# Patient Record
Sex: MALE | Race: WHITE | URBAN - METROPOLITAN AREA
[De-identification: names, ages, dates, MRNs, and addresses within clinical notes are randomized per-mention and may not be internally consistent; named-entity substitution may affect disease eponyms.]

---

## 2017-01-12 ENCOUNTER — OFFICE VISIT (OUTPATIENT)
Dept: SURGERY | Age: 82
End: 2017-01-12

## 2017-01-12 VITALS
HEIGHT: 71 IN | HEART RATE: 94 BPM | DIASTOLIC BLOOD PRESSURE: 83 MMHG | BODY MASS INDEX: 26.88 KG/M2 | SYSTOLIC BLOOD PRESSURE: 138 MMHG | WEIGHT: 192 LBS

## 2017-01-12 DIAGNOSIS — D17.22 LIPOMA OF LEFT UPPER EXTREMITY: ICD-10-CM

## 2017-01-12 DIAGNOSIS — D18.00 CAVERNOUS ANGIOMA: Primary | ICD-10-CM

## 2017-01-12 PROCEDURE — 99024 POSTOP FOLLOW-UP VISIT: CPT | Performed by: SURGERY

## 2017-01-24 ENCOUNTER — PROCEDURE VISIT (OUTPATIENT)
Dept: SURGERY | Age: 82
End: 2017-01-24

## 2017-01-24 VITALS
HEART RATE: 79 BPM | BODY MASS INDEX: 26.88 KG/M2 | HEIGHT: 71 IN | DIASTOLIC BLOOD PRESSURE: 78 MMHG | SYSTOLIC BLOOD PRESSURE: 136 MMHG | WEIGHT: 192 LBS

## 2017-01-24 DIAGNOSIS — D17.22 LIPOMA OF LEFT UPPER EXTREMITY: Primary | ICD-10-CM

## 2017-01-24 PROCEDURE — 99024 POSTOP FOLLOW-UP VISIT: CPT | Performed by: SURGERY

## 2024-07-18 ENCOUNTER — OFFICE VISIT (OUTPATIENT)
Dept: ENT CLINIC | Age: 89
End: 2024-07-18
Payer: MEDICARE

## 2024-07-18 VITALS
WEIGHT: 186 LBS | BODY MASS INDEX: 27.55 KG/M2 | HEART RATE: 73 BPM | DIASTOLIC BLOOD PRESSURE: 80 MMHG | HEIGHT: 69 IN | OXYGEN SATURATION: 96 % | SYSTOLIC BLOOD PRESSURE: 146 MMHG

## 2024-07-18 DIAGNOSIS — R09.89 CHRONIC THROAT CLEARING: ICD-10-CM

## 2024-07-18 DIAGNOSIS — R09.89 PHLEGM IN THROAT: ICD-10-CM

## 2024-07-18 DIAGNOSIS — K21.9 LARYNGOPHARYNGEAL REFLUX (LPR): ICD-10-CM

## 2024-07-18 DIAGNOSIS — R13.10 DYSPHAGIA, UNSPECIFIED TYPE: Primary | ICD-10-CM

## 2024-07-18 PROCEDURE — G8427 DOCREV CUR MEDS BY ELIG CLIN: HCPCS | Performed by: STUDENT IN AN ORGANIZED HEALTH CARE EDUCATION/TRAINING PROGRAM

## 2024-07-18 PROCEDURE — 1123F ACP DISCUSS/DSCN MKR DOCD: CPT | Performed by: STUDENT IN AN ORGANIZED HEALTH CARE EDUCATION/TRAINING PROGRAM

## 2024-07-18 PROCEDURE — 1036F TOBACCO NON-USER: CPT | Performed by: STUDENT IN AN ORGANIZED HEALTH CARE EDUCATION/TRAINING PROGRAM

## 2024-07-18 PROCEDURE — 99204 OFFICE O/P NEW MOD 45 MIN: CPT | Performed by: STUDENT IN AN ORGANIZED HEALTH CARE EDUCATION/TRAINING PROGRAM

## 2024-07-18 PROCEDURE — G8419 CALC BMI OUT NRM PARAM NOF/U: HCPCS | Performed by: STUDENT IN AN ORGANIZED HEALTH CARE EDUCATION/TRAINING PROGRAM

## 2024-07-18 PROCEDURE — 31575 DIAGNOSTIC LARYNGOSCOPY: CPT | Performed by: STUDENT IN AN ORGANIZED HEALTH CARE EDUCATION/TRAINING PROGRAM

## 2024-07-18 RX ORDER — PANTOPRAZOLE SODIUM 40 MG/1
40 TABLET, DELAYED RELEASE ORAL 2 TIMES DAILY
Qty: 180 TABLET | Refills: 0 | Status: SHIPPED | OUTPATIENT
Start: 2024-07-18 | End: 2024-10-16

## 2024-07-18 RX ORDER — GUAIFENESIN 600 MG/1
600 TABLET, EXTENDED RELEASE ORAL 2 TIMES DAILY
Qty: 30 TABLET | Refills: 0 | Status: SHIPPED | OUTPATIENT
Start: 2024-07-18 | End: 2024-08-02

## 2024-07-18 NOTE — PROGRESS NOTES
[facility]  DIVISION OF OTOLARYNGOLOGY- HEAD & NECK SURGERY  CONSULT      Samuel Chowdhury (:  4/3/1932) is a 89 y.o. male, here for evaluation of the following chief complaint(s):  Hoarse (Clearing throat )      ASSESSMENT/PLAN:  1. Dysphagia, unspecified type  2. Chronic throat clearing  3. Phlegm in throat  4. Laryngopharyngeal reflux (LPR)      This is a very pleasant 89 y.o. male here today for evaluation of the the above-noted complaints.      I will prescribe the patient guaifenesin 600 mg to use for the next several weeks to see if this improves some of his secretions.    I will begin him on Protonix 40 mg twice daily.    We discussed that phlegm in the back of the throat can be difficult to treat.  We will treat the most common causes first.  Consideration of referral to speech and language pathology for reflux management and possible chronic throat clearing.  We discussed prognosis is guarded especially given the chronicity of his symptoms..    Medical Decision Making:  The following items were considered in medical decision making:  Independent review of images  Review / order clinical lab tests  Review / order radiology tests  Decision to obtain old records  Review and summation of old records as accessed through [employer] if applicable    SUBJECTIVE/OBJECTIVE:  HPI    Samuel Chowdhury is here today for evaluation of issues related to his throat.  The patient states that he has been having chronic throat clearing for over a decade.  It is gradually getting worse.  Sometimes his voice will become muffled and he will have voice changes and trouble swallowing.  No aspiration, no recent pneumonia, no unintentional weight loss, fevers, chills, night sweats or neck masses.  No hemoptysis.  He thinks he has tried reflux medication in the past.  No recent changes to reflux medication.  Has not been seen by GI recently.  No new medications.  Phlegm is clear to white.  No sinus infections.    He has been

## 2024-11-13 ENCOUNTER — OFFICE VISIT (OUTPATIENT)
Dept: ENT CLINIC | Age: 89
End: 2024-11-13

## 2024-11-13 VITALS
WEIGHT: 190 LBS | DIASTOLIC BLOOD PRESSURE: 72 MMHG | OXYGEN SATURATION: 96 % | SYSTOLIC BLOOD PRESSURE: 128 MMHG | BODY MASS INDEX: 28.14 KG/M2 | HEIGHT: 69 IN | HEART RATE: 80 BPM

## 2024-11-13 DIAGNOSIS — R09.89 PHLEGM IN THROAT: ICD-10-CM

## 2024-11-13 DIAGNOSIS — R09.89 CHRONIC THROAT CLEARING: ICD-10-CM

## 2024-11-13 DIAGNOSIS — R13.10 DYSPHAGIA, UNSPECIFIED TYPE: Primary | ICD-10-CM

## 2024-11-13 DIAGNOSIS — K21.9 LARYNGOPHARYNGEAL REFLUX (LPR): ICD-10-CM

## 2024-11-13 RX ORDER — PANTOPRAZOLE SODIUM 40 MG/1
40 TABLET, DELAYED RELEASE ORAL 2 TIMES DAILY
Qty: 180 TABLET | Refills: 0 | Status: SHIPPED | OUTPATIENT
Start: 2024-11-13 | End: 2025-02-11

## 2024-11-13 RX ORDER — GABAPENTIN 100 MG/1
100 CAPSULE ORAL NIGHTLY
Qty: 90 CAPSULE | Refills: 1 | Status: SHIPPED | OUTPATIENT
Start: 2024-11-13 | End: 2025-05-12

## 2024-11-13 RX ORDER — GUAIFENESIN 600 MG/1
600 TABLET, EXTENDED RELEASE ORAL 2 TIMES DAILY
Qty: 30 TABLET | Refills: 0 | Status: SHIPPED | OUTPATIENT
Start: 2024-11-13 | End: 2024-12-13

## 2024-11-13 NOTE — PROGRESS NOTES
[facility]  DIVISION OF OTOLARYNGOLOGY- HEAD & NECK SURGERY  CONSULT      Samuel Chowdhury (:  4/3/1932) is a 89 y.o. male, here for evaluation of the following chief complaint(s):  Hoarse (Throat clearing )      ASSESSMENT/PLAN:  1. Dysphagia, unspecified type  2. Chronic throat clearing  3. Phlegm in throat  4. Laryngopharyngeal reflux (LPR)        This is a very pleasant 89 y.o. male here today for evaluation of the the above-noted complaints.      -Continue guaifenasin prn  -Continue protonix for 3 months  -Begin gabapentin titration. Instructions provided. Discussed R/B/A in detail with the patient and his wife.    We again discussed that phlegm in the back of the throat can be difficult to treat.    Consideration of referral to speech and language pathology for reflux management and possible chronic throat clearing.  We discussed prognosis is guarded especially given the chronicity of his symptoms and failure to significantly improve thus far.    Medical Decision Making:  The following items were considered in medical decision making:  Independent review of images  Review / order clinical lab tests  Review / order radiology tests  Decision to obtain old records  Review and summation of old records as accessed through [employer] if applicable    SUBJECTIVE/OBJECTIVE:  HPI    Samuel Chowdhury is here today for evaluation of issues related to his throat.  The patient states that he has been having chronic throat clearing for over a decade.  It is gradually getting worse.  Sometimes his voice will become muffled and he will have voice changes and trouble swallowing.  No aspiration, no recent pneumonia, no unintentional weight loss, fevers, chills, night sweats or neck masses.  No hemoptysis.  He thinks he has tried reflux medication in the past.  No recent changes to reflux medication.  Has not been seen by GI recently.  No new medications.  Phlegm is clear to white.  No sinus infections.    He has been